# Patient Record
Sex: FEMALE | Race: WHITE | URBAN - METROPOLITAN AREA
[De-identification: names, ages, dates, MRNs, and addresses within clinical notes are randomized per-mention and may not be internally consistent; named-entity substitution may affect disease eponyms.]

---

## 2020-07-25 ENCOUNTER — TELEPHONE ENCOUNTER (OUTPATIENT)
Dept: URBAN - METROPOLITAN AREA CLINIC 13 | Facility: CLINIC | Age: 75
End: 2020-07-25

## 2020-07-25 RX ORDER — OMEGA-3/DHA/EPA/FISH OIL 300-1000MG
TAKE 1 TABLET DAILY AS DIRECTED CAPSULE ORAL
Refills: 0 | OUTPATIENT
End: 2013-07-22

## 2020-07-25 RX ORDER — HYOSCYAMINE SULFATE 0.12 MG/1
PLACE 1 TABLET EVERY 6 HOURS PRN TABLET, ORALLY DISINTEGRATING ORAL
Qty: 50 | Refills: 1 | OUTPATIENT
Start: 2013-08-22 | End: 2013-10-17

## 2020-07-25 RX ORDER — OMEPRAZOLE 40 MG/1
TAKE 1 CAPSULE DAILY CAPSULE, DELAYED RELEASE ORAL
Qty: 30 | Refills: 3 | OUTPATIENT
Start: 2013-05-20 | End: 2013-06-11

## 2020-07-25 RX ORDER — BIOTIN 2500 MCG
TAKE 1 CAPSULE DAILY CAPSULE ORAL
Refills: 0 | OUTPATIENT
End: 2014-06-16

## 2020-07-25 RX ORDER — LANSOPRAZOLE 30 MG/1
TAKE 1 CAPSULE DAILY CAPSULE, DELAYED RELEASE ORAL
Qty: 30 | Refills: 3 | OUTPATIENT
Start: 2013-06-11 | End: 2015-04-03

## 2020-07-25 RX ORDER — POLYETHYLENE GLYCOL 3350, SODIUM SULFATE, SODIUM CHLORIDE, POTASSIUM CHLORIDE, ASCORBIC ACID, SODIUM ASCORBATE 7.5-2.691G
USE AS DIRECTED KIT ORAL
Qty: 1 | Refills: 0 | OUTPATIENT
Start: 2013-03-29 | End: 2013-04-22

## 2020-07-25 RX ORDER — COLESEVELAM HYDROCHLORIDE 625 MG/1
TAKE  TABLET TWICE DAILY TABLET, FILM COATED ORAL
Qty: 60 | Refills: 3 | OUTPATIENT
Start: 2015-02-03 | End: 2015-08-24

## 2020-07-25 RX ORDER — PANTOPRAZOLE SODIUM 40 MG/1
TAKE 1 TABLET DAILY TABLET, DELAYED RELEASE ORAL
Qty: 30 | Refills: 2 | OUTPATIENT
Start: 2013-07-22 | End: 2013-08-22

## 2020-07-26 ENCOUNTER — TELEPHONE ENCOUNTER (OUTPATIENT)
Dept: URBAN - METROPOLITAN AREA CLINIC 13 | Facility: CLINIC | Age: 75
End: 2020-07-26

## 2020-07-26 RX ORDER — PRAVASTATIN SODIUM 20 MG/1
TAKE 1 TABLET DAILY TABLET ORAL
Refills: 0 | Status: ACTIVE | COMMUNITY

## 2020-07-26 RX ORDER — OMEPRAZOLE 20 MG/1
TAKE 1 CAPSULE DAILY BEFORE EATING CAPSULE, DELAYED RELEASE ORAL
Qty: 30 | Refills: 5 | Status: ACTIVE | COMMUNITY
Start: 2015-08-24

## 2020-07-26 RX ORDER — FOLIC ACID 0.8 MG
TAKE 1 CAPSULE DAILY TABLET ORAL
Refills: 0 | Status: ACTIVE | COMMUNITY

## 2020-07-26 RX ORDER — CHLORHEXIDINE GLUCONATE 4 %
TAKE 1 TABLET TWICE DAILY LIQUID (ML) TOPICAL
Qty: 60 | Refills: 1 | Status: ACTIVE | COMMUNITY
Start: 2015-02-15

## 2020-07-26 RX ORDER — CALCIUM CARBONATE/VITAMIN D3 500-10/5ML
TAKE 1 CAPSULE LIQUID (ML) ORAL
Refills: 0 | Status: ACTIVE | COMMUNITY

## 2020-07-26 RX ORDER — ASCORBIC ACID 500 MG
TAKE 1 TABLET DAILY TABLET ORAL
Refills: 0 | Status: ACTIVE | COMMUNITY

## 2021-08-30 ENCOUNTER — OFFICE VISIT (OUTPATIENT)
Dept: URBAN - METROPOLITAN AREA CLINIC 128 | Facility: CLINIC | Age: 76
End: 2021-08-30
Payer: MEDICARE

## 2021-08-30 ENCOUNTER — WEB ENCOUNTER (OUTPATIENT)
Dept: URBAN - METROPOLITAN AREA CLINIC 128 | Facility: CLINIC | Age: 76
End: 2021-08-30

## 2021-08-30 DIAGNOSIS — Z87.19 HISTORY OF BARRETT'S ESOPHAGUS: ICD-10-CM

## 2021-08-30 DIAGNOSIS — K50.019 CROHN'S DISEASE OF SMALL INTESTINE WITH COMPLICATION: ICD-10-CM

## 2021-08-30 DIAGNOSIS — R19.7 DIARRHEA, UNSPECIFIED TYPE: ICD-10-CM

## 2021-08-30 DIAGNOSIS — I72.9 ANEURYSM: ICD-10-CM

## 2021-08-30 DIAGNOSIS — R12 HEARTBURN: ICD-10-CM

## 2021-08-30 PROBLEM — 432119003: Status: ACTIVE | Noted: 2021-08-30

## 2021-08-30 PROCEDURE — 99214 OFFICE O/P EST MOD 30 MIN: CPT | Performed by: INTERNAL MEDICINE

## 2021-08-30 RX ORDER — OMEPRAZOLE 20 MG/1
TAKE 1 CAPSULE DAILY BEFORE EATING CAPSULE, DELAYED RELEASE ORAL
Qty: 30 | Refills: 5 | Status: ACTIVE | COMMUNITY
Start: 2015-08-24

## 2021-08-30 RX ORDER — ASCORBIC ACID 500 MG
TAKE 1 TABLET DAILY TABLET ORAL
Refills: 0 | Status: ACTIVE | COMMUNITY

## 2021-08-30 RX ORDER — FOLIC ACID 0.8 MG
TAKE 1 CAPSULE DAILY TABLET ORAL
Refills: 0 | Status: ACTIVE | COMMUNITY

## 2021-08-30 RX ORDER — CHLORHEXIDINE GLUCONATE 4 %
TAKE 1 TABLET TWICE DAILY LIQUID (ML) TOPICAL
Qty: 60 | Refills: 1 | Status: ACTIVE | COMMUNITY
Start: 2015-02-15

## 2021-08-30 RX ORDER — PRAVASTATIN SODIUM 20 MG/1
TAKE 1 TABLET DAILY TABLET ORAL
Refills: 0 | Status: ACTIVE | COMMUNITY

## 2021-08-30 RX ORDER — CALCIUM CARBONATE/VITAMIN D3 500-10/5ML
TAKE 1 CAPSULE LIQUID (ML) ORAL
Refills: 0 | Status: ACTIVE | COMMUNITY

## 2021-08-30 RX ORDER — AMLODIPINE AND BENAZEPRIL HYDROCHLORIDE 5; 20 MG/1; MG/1
TAKE 1 CAPSULE BY ORAL ROUTE ONCE DAILY CAPSULE ORAL 1
Qty: 0 | Refills: 0 | Status: ACTIVE | COMMUNITY
Start: 1900-01-01 | End: 1900-01-01

## 2021-08-30 NOTE — HPI-TODAY'S VISIT:
Mrs. Peña is a 75 year old female who was last seen in GI clinic on 2/14/2020 for Crohn's disease.    Today she reports having diarrhea. She reports coffee is a known trigger.   She reports having an aneursym for which she will be having surgery on 9/14/2021.   Prior history is summarized below: -She had an EGD and colonoscopy on 11/30/2017 with Dr. Herrera. The EGD showed a small hiatal. The colonoscopy showed non-bleeding erosions in the terminal ileum and small internal hemorrhoids. The GE junction biopsies showed reflux associated changes. Terminal ileum showed "mild chronic active enteritis, consistent with history of Crohn's disease. Random colon biopsies were unremarkable.  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  -She on no medications for Crohn's disease as she is concerned about side effects of Crohn's disease.

## 2021-09-20 ENCOUNTER — OFFICE VISIT (OUTPATIENT)
Dept: URBAN - METROPOLITAN AREA MEDICAL CENTER 25 | Facility: MEDICAL CENTER | Age: 76
End: 2021-09-20
Payer: MEDICARE

## 2021-09-20 DIAGNOSIS — K52.89 (LYMPHOCYTIC) MICROSCOPIC COLITIS: ICD-10-CM

## 2021-09-20 DIAGNOSIS — K29.80 ACUTE DUODENITIS: ICD-10-CM

## 2021-09-20 DIAGNOSIS — K50.10 CC (CROHN'S COLITIS): ICD-10-CM

## 2021-09-20 DIAGNOSIS — K29.60 ADENOPAPILLOMATOSIS GASTRICA: ICD-10-CM

## 2021-09-20 DIAGNOSIS — R19.7 ACUTE DIARRHEA: ICD-10-CM

## 2021-09-20 PROCEDURE — 43239 EGD BIOPSY SINGLE/MULTIPLE: CPT | Performed by: INTERNAL MEDICINE

## 2021-09-20 PROCEDURE — 45380 COLONOSCOPY AND BIOPSY: CPT | Performed by: INTERNAL MEDICINE

## 2021-09-20 RX ORDER — PRAVASTATIN SODIUM 20 MG/1
TAKE 1 TABLET DAILY TABLET ORAL
Refills: 0 | Status: ACTIVE | COMMUNITY

## 2021-09-20 RX ORDER — FOLIC ACID 0.8 MG
TAKE 1 CAPSULE DAILY TABLET ORAL
Refills: 0 | Status: ACTIVE | COMMUNITY

## 2021-09-20 RX ORDER — CHLORHEXIDINE GLUCONATE 4 %
TAKE 1 TABLET TWICE DAILY LIQUID (ML) TOPICAL
Qty: 60 | Refills: 1 | Status: ACTIVE | COMMUNITY
Start: 2015-02-15

## 2021-09-20 RX ORDER — CALCIUM CARBONATE/VITAMIN D3 500-10/5ML
TAKE 1 CAPSULE LIQUID (ML) ORAL
Refills: 0 | Status: ACTIVE | COMMUNITY

## 2021-09-20 RX ORDER — ASCORBIC ACID 500 MG
TAKE 1 TABLET DAILY TABLET ORAL
Refills: 0 | Status: ACTIVE | COMMUNITY

## 2021-09-20 RX ORDER — AMLODIPINE AND BENAZEPRIL HYDROCHLORIDE 5; 20 MG/1; MG/1
TAKE 1 CAPSULE BY ORAL ROUTE ONCE DAILY CAPSULE ORAL 1
Qty: 0 | Refills: 0 | Status: ACTIVE | COMMUNITY
Start: 1900-01-01 | End: 1900-01-01

## 2021-09-20 RX ORDER — OMEPRAZOLE 20 MG/1
TAKE 1 CAPSULE DAILY BEFORE EATING CAPSULE, DELAYED RELEASE ORAL
Qty: 30 | Refills: 5 | Status: ACTIVE | COMMUNITY
Start: 2015-08-24

## 2021-09-22 ENCOUNTER — TELEPHONE ENCOUNTER (OUTPATIENT)
Dept: URBAN - METROPOLITAN AREA CLINIC 92 | Facility: CLINIC | Age: 76
End: 2021-09-22

## 2021-10-07 ENCOUNTER — OFFICE VISIT (OUTPATIENT)
Dept: URBAN - METROPOLITAN AREA CLINIC 19 | Facility: CLINIC | Age: 76
End: 2021-10-07
Payer: MEDICARE

## 2021-10-07 DIAGNOSIS — R10.11 RUQ ABDOMINAL PAIN: ICD-10-CM

## 2021-10-07 DIAGNOSIS — K50.019 CROHN'S DISEASE OF SMALL INTESTINE WITH COMPLICATION: ICD-10-CM

## 2021-10-07 PROCEDURE — 99214 OFFICE O/P EST MOD 30 MIN: CPT | Performed by: INTERNAL MEDICINE

## 2021-10-07 RX ORDER — FOLIC ACID 0.8 MG
TAKE 1 CAPSULE DAILY TABLET ORAL
Refills: 0 | Status: ACTIVE | COMMUNITY

## 2021-10-07 RX ORDER — PRAVASTATIN SODIUM 20 MG/1
TAKE 1 TABLET DAILY TABLET ORAL
Refills: 0 | Status: ACTIVE | COMMUNITY

## 2021-10-07 RX ORDER — ASCORBIC ACID 500 MG
TAKE 1 TABLET DAILY TABLET ORAL
Refills: 0 | Status: ACTIVE | COMMUNITY

## 2021-10-07 RX ORDER — AMLODIPINE AND BENAZEPRIL HYDROCHLORIDE 5; 20 MG/1; MG/1
TAKE 1 CAPSULE BY ORAL ROUTE ONCE DAILY CAPSULE ORAL 1
Qty: 0 | Refills: 0 | Status: ACTIVE | COMMUNITY
Start: 1900-01-01

## 2021-10-07 RX ORDER — CALCIUM CARBONATE/VITAMIN D3 500-10/5ML
TAKE 1 CAPSULE LIQUID (ML) ORAL
Refills: 0 | Status: ACTIVE | COMMUNITY

## 2021-10-07 RX ORDER — CHLORHEXIDINE GLUCONATE 4 %
TAKE 1 TABLET TWICE DAILY LIQUID (ML) TOPICAL
Qty: 60 | Refills: 1 | Status: ACTIVE | COMMUNITY
Start: 2015-02-15

## 2021-10-07 RX ORDER — OMEPRAZOLE 20 MG/1
TAKE 1 CAPSULE DAILY BEFORE EATING CAPSULE, DELAYED RELEASE ORAL
Qty: 30 | Refills: 5 | Status: ACTIVE | COMMUNITY
Start: 2015-08-24

## 2021-10-07 NOTE — HPI-TODAY'S VISIT:
Mrs. Peña is a 76 year old female who was last seen in GI clinic on 8/30/2021 for Crohn's disease.       On 9/20/2021 she had an EGD and colonoscopy. EGD showed small hiatal hernia, mild gastritis, and multiple erosions in the duodenum. The colonoscopy showed multiple ulcers in soham-terminal ileum.   Pathology of the lower esophagus was unremarkable. Gastric biopsies were negative for H. pylori. Duodenal biopsies were negative for Celiac disease. Terminal ileal biopsies showed focal acute inflammations. Random colon biopsies were negative for microscopic colitis.   She had a CT chest performed by her Pulmonary physician which showed compared to 6/4/2021 there was a decreased size and conspicuity of a groundglass nodule in the lateral right lower lobe. This currently measures up to 3 mm, previously 8 mm in keeping with benign infectious or inflammatory etiology. Additional subcentimeter bilateral pulmonary nodules measuring up to 5 mm are stable." The report mentions cholelithiasis without surrounding inflammation to suggest acute cholecystitis. Calcified granulomas in the liver and spleen.  Today she reports having RUQ abdominal pain. She is concerned her pain may be secondary to exercises.   She reports she is going to have aneuysm surgery soon.   Prior history is summarized below: -She had an EGD and colonoscopy on 11/30/2017 with Dr. Herrera. The EGD showed a small hiatal. The colonoscopy showed non-bleeding erosions in the terminal ileum and small internal hemorrhoids. The GE junction biopsies showed reflux associated changes. Terminal ileum showed "mild chronic active enteritis, consistent with history of Crohn's disease. Random colon biopsies were unremarkable.  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  -She on no medications for Crohn's disease as she is concerned about side effects of Crohn's disease.

## 2021-10-21 ENCOUNTER — OFFICE VISIT (OUTPATIENT)
Dept: URBAN - METROPOLITAN AREA CLINIC 19 | Facility: CLINIC | Age: 76
End: 2021-10-21
Payer: MEDICARE

## 2021-10-21 DIAGNOSIS — R10.11 RUQ ABDOMINAL PAIN: ICD-10-CM

## 2021-10-21 DIAGNOSIS — K50.019 CROHN'S DISEASE OF SMALL INTESTINE WITH COMPLICATION: ICD-10-CM

## 2021-10-21 PROCEDURE — 99214 OFFICE O/P EST MOD 30 MIN: CPT | Performed by: INTERNAL MEDICINE

## 2021-10-21 RX ORDER — FOLIC ACID 0.8 MG
TAKE 1 CAPSULE DAILY TABLET ORAL
Refills: 0 | Status: ACTIVE | COMMUNITY

## 2021-10-21 RX ORDER — OMEPRAZOLE 20 MG/1
TAKE 1 CAPSULE DAILY BEFORE EATING CAPSULE, DELAYED RELEASE ORAL
Qty: 30 | Refills: 5 | Status: ACTIVE | COMMUNITY
Start: 2015-08-24

## 2021-10-21 RX ORDER — PANTOPRAZOLE SODIUM 20 MG/1
1 TABLET TABLET, DELAYED RELEASE ORAL ONCE A DAY
Qty: 90 TABLET | Refills: 3 | OUTPATIENT
Start: 2021-10-21

## 2021-10-21 RX ORDER — AMLODIPINE AND BENAZEPRIL HYDROCHLORIDE 5; 20 MG/1; MG/1
TAKE 1 CAPSULE BY ORAL ROUTE ONCE DAILY CAPSULE ORAL 1
Qty: 0 | Refills: 0 | Status: ACTIVE | COMMUNITY
Start: 1900-01-01

## 2021-10-21 RX ORDER — CALCIUM CARBONATE/VITAMIN D3 500-10/5ML
TAKE 1 CAPSULE LIQUID (ML) ORAL
Refills: 0 | Status: ACTIVE | COMMUNITY

## 2021-10-21 RX ORDER — CHLORHEXIDINE GLUCONATE 4 %
TAKE 1 TABLET TWICE DAILY LIQUID (ML) TOPICAL
Qty: 60 | Refills: 1 | Status: ACTIVE | COMMUNITY
Start: 2015-02-15

## 2021-10-21 RX ORDER — PRAVASTATIN SODIUM 20 MG/1
TAKE 1 TABLET DAILY TABLET ORAL
Refills: 0 | Status: ACTIVE | COMMUNITY

## 2021-10-21 RX ORDER — ASCORBIC ACID 500 MG
TAKE 1 TABLET DAILY TABLET ORAL
Refills: 0 | Status: ACTIVE | COMMUNITY

## 2021-10-21 NOTE — HPI-TODAY'S VISIT:
Mrs. Peña is a 76 year old female who was last seen in GI clinic on 10/7/2021 for Crohn's disease.         On 10/11/2021 RUQ US showed cholelithiasis without significant gallbladder wall thickening.   She reports doing well with protonix 40mg PO daily.   Prior history is summarized below: -She had an EGD and colonoscopy on 11/30/2017 with Dr. Herrera. The EGD showed a small hiatal. The colonoscopy showed non-bleeding erosions in the terminal ileum and small internal hemorrhoids. The GE junction biopsies showed reflux associated changes. Terminal ileum showed "mild chronic active enteritis, consistent with history of Crohn's disease. Random colon biopsies were unremarkable.  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  -She on no medications for Crohn's disease as she is concerned about side effects of Crohn's disease. -On 9/20/2021 she had an EGD and colonoscopy. EGD showed small hiatal hernia, mild gastritis, and multiple erosions in the duodenum. The colonoscopy showed multiple ulcers in soham-terminal ileum. Pathology of the lower esophagus was unremarkable. Gastric biopsies were negative for H. pylori. Duodenal biopsies were negative for Celiac disease. Terminal ileal biopsies showed focal acute inflammations. Random colon biopsies were negative for microscopic colitis.  -She had a CT chest performed by her Pulmonary physician which showed compared to 6/4/2021 there was a decreased size and conspicuity of a groundglass nodule in the lateral right lower lobe. This currently measures up to 3 mm, previously 8 mm in keeping with benign infectious or inflammatory etiology. Additional subcentimeter bilateral pulmonary nodules measuring up to 5 mm are stable." The report mentions cholelithiasis without surrounding inflammation to suggest acute cholecystitis. Calcified granulomas in the liver and spleen.

## 2021-10-22 ENCOUNTER — TELEPHONE ENCOUNTER (OUTPATIENT)
Dept: URBAN - METROPOLITAN AREA CLINIC 19 | Facility: CLINIC | Age: 76
End: 2021-10-22

## 2022-04-20 ENCOUNTER — OFFICE VISIT (OUTPATIENT)
Dept: URBAN - METROPOLITAN AREA CLINIC 19 | Facility: CLINIC | Age: 77
End: 2022-04-20

## 2022-04-25 ENCOUNTER — OFFICE VISIT (OUTPATIENT)
Dept: URBAN - METROPOLITAN AREA CLINIC 128 | Facility: CLINIC | Age: 77
End: 2022-04-25

## 2022-05-06 ENCOUNTER — OFFICE VISIT (OUTPATIENT)
Dept: URBAN - METROPOLITAN AREA CLINIC 128 | Facility: CLINIC | Age: 77
End: 2022-05-06

## 2022-05-13 ENCOUNTER — OFFICE VISIT (OUTPATIENT)
Dept: URBAN - METROPOLITAN AREA CLINIC 128 | Facility: CLINIC | Age: 77
End: 2022-05-13
Payer: MEDICARE

## 2022-05-13 ENCOUNTER — WEB ENCOUNTER (OUTPATIENT)
Dept: URBAN - METROPOLITAN AREA CLINIC 128 | Facility: CLINIC | Age: 77
End: 2022-05-13

## 2022-05-13 DIAGNOSIS — K50.019 CROHN'S DISEASE OF SMALL INTESTINE WITH COMPLICATION: ICD-10-CM

## 2022-05-13 PROCEDURE — 99214 OFFICE O/P EST MOD 30 MIN: CPT | Performed by: INTERNAL MEDICINE

## 2022-05-13 RX ORDER — CALCIUM CARBONATE/VITAMIN D3 500-10/5ML
TAKE 1 CAPSULE LIQUID (ML) ORAL
Refills: 0 | Status: DISCONTINUED | COMMUNITY

## 2022-05-13 RX ORDER — FOLIC ACID 0.8 MG
TAKE 1 CAPSULE DAILY TABLET ORAL
Refills: 0 | Status: DISCONTINUED | COMMUNITY

## 2022-05-13 RX ORDER — PRAVASTATIN SODIUM 20 MG/1
TAKE 1 TABLET DAILY TABLET ORAL
Refills: 0 | Status: ACTIVE | COMMUNITY

## 2022-05-13 RX ORDER — PANTOPRAZOLE SODIUM 20 MG/1
1 TABLET TABLET, DELAYED RELEASE ORAL ONCE A DAY
Qty: 90 TABLET | Refills: 3 | Status: ACTIVE | COMMUNITY
Start: 2021-10-21

## 2022-05-13 RX ORDER — AMLODIPINE AND BENAZEPRIL HYDROCHLORIDE 5; 20 MG/1; MG/1
TAKE 1 CAPSULE BY ORAL ROUTE ONCE DAILY CAPSULE ORAL 1
Qty: 0 | Refills: 0 | Status: ACTIVE | COMMUNITY
Start: 1900-01-01

## 2022-05-13 RX ORDER — OMEPRAZOLE 20 MG/1
TAKE 1 CAPSULE DAILY BEFORE EATING CAPSULE, DELAYED RELEASE ORAL
Qty: 30 | Refills: 5 | Status: DISCONTINUED | COMMUNITY
Start: 2015-08-24

## 2022-05-13 RX ORDER — ASCORBIC ACID 500 MG
TAKE 1 TABLET DAILY TABLET ORAL
Refills: 0 | Status: ACTIVE | COMMUNITY

## 2022-05-13 RX ORDER — CHLORHEXIDINE GLUCONATE 4 %
TAKE 1 TABLET TWICE DAILY LIQUID (ML) TOPICAL
Qty: 60 | Refills: 1 | Status: DISCONTINUED | COMMUNITY
Start: 2015-02-15

## 2022-05-13 NOTE — HPI-TODAY'S VISIT:
Mrs. Peña is a 76 year old female who was last seen in GI clinic on 10/7/2021 for Crohn's disease.            At last clinic visit we prescribed budesonide which she reports not taking. She reports being concerned about side effects of medications and wants to know about other options she may have.   Prior history is summarized below: -She had an EGD and colonoscopy on 11/30/2017 with Dr. Herrera. The EGD showed a small hiatal. The colonoscopy showed non-bleeding erosions in the terminal ileum and small internal hemorrhoids. The GE junction biopsies showed reflux associated changes. Terminal ileum showed "mild chronic active enteritis, consistent with history of Crohn's disease. Random colon biopsies were unremarkable.  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  -She on no medications for Crohn's disease as she is concerned about side effects of Crohn's disease. -On 9/20/2021 she had an EGD and colonoscopy. EGD showed small hiatal hernia, mild gastritis, and multiple erosions in the duodenum. The colonoscopy showed multiple ulcers in soham-terminal ileum. Pathology of the lower esophagus was unremarkable. Gastric biopsies were negative for H. pylori. Duodenal biopsies were negative for Celiac disease. Terminal ileal biopsies showed focal acute inflammations. Random colon biopsies were negative for microscopic colitis.  -She had a CT chest performed by her Pulmonary physician which showed compared to 6/4/2021 there was a decreased size and conspicuity of a groundglass nodule in the lateral right lower lobe. This currently measures up to 3 mm, previously 8 mm in keeping with benign infectious or inflammatory etiology. Additional subcentimeter bilateral pulmonary nodules measuring up to 5 mm are stable." The report mentions cholelithiasis without surrounding inflammation to suggest acute cholecystitis. Calcified granulomas in the liver and spleen. -On 10/11/2021 RUQ US showed cholelithiasis without significant gallbladder wall thickening.

## 2022-05-16 ENCOUNTER — TELEPHONE ENCOUNTER (OUTPATIENT)
Dept: URBAN - METROPOLITAN AREA CLINIC 19 | Facility: CLINIC | Age: 77
End: 2022-05-16

## 2022-06-08 ENCOUNTER — OFFICE VISIT (OUTPATIENT)
Dept: URBAN - METROPOLITAN AREA CLINIC 96 | Facility: CLINIC | Age: 77
End: 2022-06-08
Payer: MEDICARE

## 2022-06-08 DIAGNOSIS — R12 HEARTBURN: ICD-10-CM

## 2022-06-08 DIAGNOSIS — R19.7 DIARRHEA, UNSPECIFIED TYPE: ICD-10-CM

## 2022-06-08 DIAGNOSIS — R10.11 RUQ ABDOMINAL PAIN: ICD-10-CM

## 2022-06-08 DIAGNOSIS — K50.019 CROHN'S DISEASE OF SMALL INTESTINE WITH COMPLICATION: ICD-10-CM

## 2022-06-08 PROCEDURE — 99205 OFFICE O/P NEW HI 60 MIN: CPT | Performed by: INTERNAL MEDICINE

## 2022-06-08 RX ORDER — PANTOPRAZOLE SODIUM 20 MG/1
1 TABLET TABLET, DELAYED RELEASE ORAL ONCE A DAY
Qty: 90 TABLET | Refills: 3 | Status: ACTIVE | COMMUNITY
Start: 2021-10-21

## 2022-06-08 RX ORDER — PRAVASTATIN SODIUM 20 MG/1
TAKE 1 TABLET DAILY TABLET ORAL
Refills: 0 | Status: ACTIVE | COMMUNITY

## 2022-06-08 RX ORDER — AMLODIPINE AND BENAZEPRIL HYDROCHLORIDE 5; 20 MG/1; MG/1
TAKE 1 CAPSULE BY ORAL ROUTE ONCE DAILY CAPSULE ORAL 1
Qty: 0 | Refills: 0 | Status: ACTIVE | COMMUNITY
Start: 1900-01-01

## 2022-06-08 RX ORDER — ASCORBIC ACID 500 MG
TAKE 1 TABLET DAILY TABLET ORAL
Refills: 0 | Status: ACTIVE | COMMUNITY

## 2022-06-08 RX ORDER — MESALAMINE 375 MG/1
4 CAPSULES IN THE MORNING CAPSULE, EXTENDED RELEASE ORAL ONCE A DAY
Qty: 120 CAPSULE | Refills: 11 | OUTPATIENT
Start: 2022-06-08 | End: 2023-06-03

## 2022-06-08 NOTE — HPI-TODAY'S VISIT:
Mrs. Peña is a 76 year old female w/ ileal Crohn's disease, currently on no meds, here for eval of her condition. . Pt is referred by Dr. Dustin Chaves. . She was dxd at time of surgery with CD around 2015 requiring ileo-colonic resection in [place].  She has done well since then. . Today on 6/8/2022, pt reports that she has rare sxs of RLQ abdominal pain especially if she eats certain foods or seeds.  Some diarrhea, 1 per day, improves with imodium.  No blood in the stool.   . EGD and colonoscopy on 11/30/2017 with Dr. Herrera. The EGD showed a small hiatal. The colonoscopy showed non-bleeding erosions in the terminal ileum and small internal hemorrhoids. The GE junction biopsies showed reflux associated changes. Terminal ileum showed "mild chronic active enteritis, consistent with history of Crohn's disease. Random colon biopsies were unremarkable.  . 9/2021: Neoterminal ileum appeared normal with multiple ulcers present;  Path: mild gastritis; nl duodenum, focal acute inflammation in ileum, nl colon biopsies. . CT chest performed by her Pulmonary physician which showed compared to 6/4/2021 there was a decreased size and conspicuity of a groundglass nodule in the lateral right lower lobe. This currently measures up to 3 mm, previously 8 mm in keeping with benign infectious or inflammatory etiology. Additional subcentimeter bilateral pulmonary nodules measuring up to 5 mm are stable." The report mentions cholelithiasis without surrounding inflammation to suggest acute cholecystitis. Calcified granulomas in the liver and spleen.. 10/11/2021 RUQ US showed cholelithiasis without significant gallbladder wall thickening.

## 2022-06-13 ENCOUNTER — TELEPHONE ENCOUNTER (OUTPATIENT)
Dept: URBAN - METROPOLITAN AREA CLINIC 92 | Facility: CLINIC | Age: 77
End: 2022-06-13

## 2022-06-15 ENCOUNTER — TELEPHONE ENCOUNTER (OUTPATIENT)
Dept: URBAN - METROPOLITAN AREA CLINIC 92 | Facility: CLINIC | Age: 77
End: 2022-06-15

## 2022-07-28 ENCOUNTER — OFFICE VISIT (OUTPATIENT)
Dept: URBAN - METROPOLITAN AREA CLINIC 19 | Facility: CLINIC | Age: 77
End: 2022-07-28

## 2022-09-12 ENCOUNTER — OFFICE VISIT (OUTPATIENT)
Dept: URBAN - METROPOLITAN AREA TELEHEALTH 2 | Facility: TELEHEALTH | Age: 77
End: 2022-09-12
Payer: MEDICARE

## 2022-09-12 VITALS — BODY MASS INDEX: 25.16 KG/M2 | HEIGHT: 63 IN | WEIGHT: 142 LBS

## 2022-09-12 DIAGNOSIS — T88.7XXA DRUG REACTION: ICD-10-CM

## 2022-09-12 DIAGNOSIS — K50.019 CROHN'S DISEASE OF SMALL INTESTINE WITH COMPLICATION: ICD-10-CM

## 2022-09-12 DIAGNOSIS — R12 HEARTBURN: ICD-10-CM

## 2022-09-12 DIAGNOSIS — R10.11 RUQ ABDOMINAL PAIN: ICD-10-CM

## 2022-09-12 DIAGNOSIS — R19.7 DIARRHEA, UNSPECIFIED TYPE: ICD-10-CM

## 2022-09-12 PROBLEM — 56689002: Status: ACTIVE | Noted: 2021-08-30

## 2022-09-12 PROBLEM — 16331000: Status: ACTIVE | Noted: 2021-09-15

## 2022-09-12 PROCEDURE — 99214 OFFICE O/P EST MOD 30 MIN: CPT | Performed by: INTERNAL MEDICINE

## 2022-09-12 RX ORDER — MESALAMINE 375 MG/1
4 CAPSULES IN THE MORNING CAPSULE, EXTENDED RELEASE ORAL ONCE A DAY
Qty: 120 CAPSULE | Refills: 11 | Status: ACTIVE | COMMUNITY
Start: 2022-06-08 | End: 2023-06-03

## 2022-09-12 RX ORDER — ASCORBIC ACID 500 MG
TAKE 1 TABLET DAILY TABLET ORAL
Refills: 0 | Status: ACTIVE | COMMUNITY

## 2022-09-12 RX ORDER — PANTOPRAZOLE SODIUM 20 MG/1
1 TABLET TABLET, DELAYED RELEASE ORAL ONCE A DAY
Qty: 90 TABLET | Refills: 3 | Status: ACTIVE | COMMUNITY
Start: 2021-10-21

## 2022-09-12 RX ORDER — MESALAMINE 375 MG/1
4 CAPSULES IN THE MORNING CAPSULE, EXTENDED RELEASE ORAL ONCE A DAY
Qty: 120 CAPSULE | Refills: 11 | OUTPATIENT

## 2022-09-12 RX ORDER — PRAVASTATIN SODIUM 20 MG/1
TAKE 1 TABLET DAILY TABLET ORAL
Refills: 0 | Status: ACTIVE | COMMUNITY

## 2022-09-12 RX ORDER — AMLODIPINE AND BENAZEPRIL HYDROCHLORIDE 5; 20 MG/1; MG/1
TAKE 1 CAPSULE BY ORAL ROUTE ONCE DAILY CAPSULE ORAL 1
Qty: 0 | Refills: 0 | Status: ACTIVE | COMMUNITY
Start: 1900-01-01

## 2022-09-12 NOTE — HPI-TODAY'S VISIT:
Mrs. Peña is a 77 year old female w/ ileal Crohn's disease, currently on Apriso (4 tab), here for eval of her condition. . Pt is referred by Dr. Dustin Chaves. . She was dxd at time of surgery with CD around 2015 requiring ileo-colonic resection in [place].  She has done well since then. . Previously on 6/8/2022, pt reports that she has rare sxs of RLQ abdominal pain especially if she eats certain foods or seeds.  Some diarrhea, 1 per day, improves with imodium.  No blood in the stool.   . Today on 9/12/2022, pt reports that she noticed that she had some abdominal pain when she took the apriso, so she stopped it and sxs resolved.  It actually seemed to caused looser stools. Gerd controlled on PPI. . EGD and colonoscopy on 11/30/2017 with Dr. Herrera. The EGD showed a small hiatal. The colonoscopy showed non-bleeding erosions in the terminal ileum and small internal hemorrhoids. The GE junction biopsies showed reflux associated changes. Terminal ileum showed "mild chronic active enteritis, consistent with history of Crohn's disease. Random colon biopsies were unremarkable. . 9/2021: Neoterminal ileum appeared normal with multiple ulcers present;  Path: mild gastritis; nl duodenum, focal acute inflammation in ileum, nl colon biopsies. . CT chest performed by her Pulmonary physician which showed compared to 6/4/2021 there was a decreased size and conspicuity of a groundglass nodule in the lateral right lower lobe. This currently measures up to 3 mm, previously 8 mm in keeping with benign infectious or inflammatory etiology. Additional subcentimeter bilateral pulmonary nodules measuring up to 5 mm are stable." The report mentions cholelithiasis without surrounding inflammation to suggest acute cholecystitis. Calcified granulomas in the liver and spleen.. 10/11/2021 RUQ US showed cholelithiasis without significant gallbladder wall thickening.

## 2023-07-13 ENCOUNTER — OFFICE VISIT (OUTPATIENT)
Dept: URBAN - METROPOLITAN AREA CLINIC 19 | Facility: CLINIC | Age: 78
End: 2023-07-13
Payer: MEDICARE

## 2023-07-13 ENCOUNTER — TELEPHONE ENCOUNTER (OUTPATIENT)
Dept: URBAN - METROPOLITAN AREA CLINIC 19 | Facility: CLINIC | Age: 78
End: 2023-07-13

## 2023-07-13 ENCOUNTER — LAB OUTSIDE AN ENCOUNTER (OUTPATIENT)
Dept: URBAN - METROPOLITAN AREA CLINIC 19 | Facility: CLINIC | Age: 78
End: 2023-07-13

## 2023-07-13 VITALS
HEIGHT: 63 IN | HEART RATE: 69 BPM | TEMPERATURE: 97.2 F | OXYGEN SATURATION: 98 % | SYSTOLIC BLOOD PRESSURE: 124 MMHG | BODY MASS INDEX: 23.81 KG/M2 | WEIGHT: 134.4 LBS | DIASTOLIC BLOOD PRESSURE: 70 MMHG

## 2023-07-13 DIAGNOSIS — K50.019 CROHN'S DISEASE OF SMALL INTESTINE WITH COMPLICATION: ICD-10-CM

## 2023-07-13 PROCEDURE — 99215 OFFICE O/P EST HI 40 MIN: CPT | Performed by: NURSE PRACTITIONER

## 2023-07-13 RX ORDER — PRAVASTATIN SODIUM 20 MG/1
TAKE 1 TABLET DAILY TABLET ORAL
Refills: 0 | Status: ACTIVE | COMMUNITY

## 2023-07-13 RX ORDER — MESALAMINE 375 MG/1
4 CAPSULES IN THE MORNING CAPSULE, EXTENDED RELEASE ORAL ONCE A DAY
Qty: 120 CAPSULE | Refills: 11 | Status: ACTIVE | COMMUNITY

## 2023-07-13 RX ORDER — ASCORBIC ACID 500 MG
TAKE 1 TABLET DAILY TABLET ORAL
Refills: 0 | Status: ACTIVE | COMMUNITY

## 2023-07-13 RX ORDER — PANTOPRAZOLE SODIUM 20 MG/1
1 TABLET TABLET, DELAYED RELEASE ORAL ONCE A DAY
Qty: 90 TABLET | Refills: 3 | Status: ACTIVE | COMMUNITY
Start: 2021-10-21

## 2023-07-13 RX ORDER — AMLODIPINE AND BENAZEPRIL HYDROCHLORIDE 5; 20 MG/1; MG/1
TAKE 1 CAPSULE BY ORAL ROUTE ONCE DAILY CAPSULE ORAL 1
Qty: 0 | Refills: 0 | Status: ACTIVE | COMMUNITY
Start: 1900-01-01

## 2023-07-13 NOTE — HPI-TODAY'S VISIT:
Ms. Peña is a 77-year-old female with ileal Crohn's disease who presents today for "bubbles in urine."  Reports seeing some bubbles in her urine in toilet  - saw Urology who did a work-up and found no issue. Told her to go see GI. She brings in pictures, urine looks normal and there a just a few bublles on the side of the toilet. Has gotten better, but notices in the morning.  Only notices it in her own toilet, not in any other toilets. Appetite is very good, has been trying to lose weight. Diarrhea when she eats bad, she has a hard time with this. Can be watery diarrhea 3-4 times/day. When she eats good, she has 1-2 mushy stools/day. No bloody or black stools. No abdominal or rectal pain. No pain or blood with urination.    Last seen in GI clinic by Dr. Chaves on 5/13/2022.  At the time he had recommended budesonide but she did not take it due to being concerned about side effects.  She was interested in options that had the least amount of side effects so she was referred to Dr. Eyad Toney.  She was subsequently started on Apriso (4 tabs) daily 0.375 g His last note indicates that she did not tolerate it in terms of abdominal pain and loose stools.  She reported that given her age, she did not want to really be aggressive with new medications unless there were symptom changes.  He said she could stop Apriso for now and if symptoms recur, to restart at 2/day instead of 4.   When asked about this, she seems unaware that she was taking anything. Later recalled she had some sprinkles prescribed that she put in her applesauce and that it worked well. She forgot to bring in the medication today so does not know the name. She states that she felt as long as she did not eat bad food, her diarrhea was better so did not feel the need to take any medication. Yet, having loose stools everyday. She also states that since she does not have pain, she sees no reason why she should be on any treatment. States she does not want any side effects. Also states she has a difficult time not eating the things that she shouldn't, loves to eat, so often has diarrhea.         Prior history is summarized below: - Chron's disease dxd at time of surgery with CD around 2015 requiring ileo-colonic resection in [place]        -She had an EGD and colonoscopy on 11/30/2017 with Dr. Herrera. The EGD showed a small hiatal. The colonoscopy showed non-bleeding erosions in the terminal ileum and small internal hemorrhoids. The GE junction biopsies showed reflux associated changes. Terminal ileum showed "mild chronic active enteritis, consistent with history of Crohn's disease. Random colon biopsies were unremarkable.        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        -She on no medications for Crohn's disease as she is concerned about side effects of Crohn's disease.        -On 9/20/2021 she had an EGD and colonoscopy. EGD showed small hiatal hernia, mild gastritis, and multiple erosions in the duodenum. The colonoscopy showed multiple ulcers in soham-terminal ileum. Pathology of the lower esophagus was unremarkable. Gastric biopsies were negative for H. pylori. Duodenal biopsies were negative for Celiac disease. Terminal ileal biopsies showed focal acute inflammations. Random colon biopsies were negative for microscopic colitis.        -She had a CT chest performed by her Pulmonary physician which showed compared to 6/4/2021 there was a decreased size and conspicuity of a groundglass nodule in the lateral right lower lobe. This currently measures up to 3 mm, previously 8 mm in keeping with benign infectious or inflammatory etiology. Additional subcentimeter bilateral pulmonary nodules measuring up to 5 mm are stable." The report mentions cholelithiasis without surrounding inflammation to suggest acute cholecystitis. Calcified granulomas in the liver and spleen.        -On 10/11/2021 RUQ US showed cholelithiasis without significant gallbladder wall thickening..

## 2023-07-24 ENCOUNTER — LAB OUTSIDE AN ENCOUNTER (OUTPATIENT)
Dept: URBAN - METROPOLITAN AREA CLINIC 19 | Facility: CLINIC | Age: 78
End: 2023-07-24

## 2023-07-24 LAB
CREATININE POC: 1.3
PERFORMING LAB: (no result)

## 2023-09-14 ENCOUNTER — LAB OUTSIDE AN ENCOUNTER (OUTPATIENT)
Dept: URBAN - METROPOLITAN AREA CLINIC 19 | Facility: CLINIC | Age: 78
End: 2023-09-14

## 2023-09-14 ENCOUNTER — OFFICE VISIT (OUTPATIENT)
Dept: URBAN - METROPOLITAN AREA CLINIC 19 | Facility: CLINIC | Age: 78
End: 2023-09-14
Payer: MEDICARE

## 2023-09-14 VITALS
TEMPERATURE: 97.3 F | WEIGHT: 133.2 LBS | HEART RATE: 81 BPM | SYSTOLIC BLOOD PRESSURE: 119 MMHG | DIASTOLIC BLOOD PRESSURE: 74 MMHG | HEIGHT: 63 IN | BODY MASS INDEX: 23.6 KG/M2

## 2023-09-14 DIAGNOSIS — K22.70 BARRETT'S ESOPHAGUS DETERMINED BY ENDOSCOPY: ICD-10-CM

## 2023-09-14 DIAGNOSIS — R82.90 ABNORMAL URINE: ICD-10-CM

## 2023-09-14 DIAGNOSIS — K50.019 CROHN'S DISEASE OF SMALL INTESTINE WITH COMPLICATION: ICD-10-CM

## 2023-09-14 PROBLEM — 302914006: Status: ACTIVE | Noted: 2023-09-14

## 2023-09-14 PROCEDURE — 99214 OFFICE O/P EST MOD 30 MIN: CPT | Performed by: INTERNAL MEDICINE

## 2023-09-14 RX ORDER — MESALAMINE 375 MG/1
4 CAPSULES IN THE MORNING CAPSULE, EXTENDED RELEASE ORAL ONCE A DAY
Qty: 120 CAPSULE | Refills: 11 | Status: ACTIVE | COMMUNITY

## 2023-09-14 RX ORDER — PANTOPRAZOLE SODIUM 20 MG/1
1 TABLET TABLET, DELAYED RELEASE ORAL ONCE A DAY
Qty: 90 TABLET | Refills: 3 | Status: ACTIVE | COMMUNITY
Start: 2021-10-21

## 2023-09-14 RX ORDER — PRAVASTATIN SODIUM 20 MG/1
TAKE 1 TABLET DAILY TABLET ORAL
Refills: 0 | Status: ACTIVE | COMMUNITY

## 2023-09-14 RX ORDER — ASCORBIC ACID 500 MG
TAKE 1 TABLET DAILY TABLET ORAL
Refills: 0 | Status: ACTIVE | COMMUNITY

## 2023-09-14 RX ORDER — AMLODIPINE AND BENAZEPRIL HYDROCHLORIDE 5; 20 MG/1; MG/1
TAKE 1 CAPSULE BY ORAL ROUTE ONCE DAILY CAPSULE ORAL 1
Qty: 0 | Refills: 0 | Status: ACTIVE | COMMUNITY
Start: 1900-01-01

## 2023-09-14 NOTE — HPI-TODAY'S VISIT:
Mrs. Peña is a 78-year-old female who was last seen in GI clinic on 7/13/2023 for ileal Crohn's disease.   On 7/24/2023 CTE showed "no acute abnormalities within the abdomen or pelvis.   Today she reports seeing in her urine bubbles and specks in her urine.   Prior history is summarized below: - Crohn's disease dxd at time of surgery with CD around 2015 requiring ileo-colonic resection in [place] -She had an EGD and colonoscopy on 11/30/2017 with Dr. Herrera. The EGD showed a small hiatal. The colonoscopy showed non-bleeding erosions in the terminal ileum and small internal hemorrhoids. The GE junction biopsies showed reflux associated changes. Terminal ileum showed "mild chronic active enteritis, consistent with history of Crohn's disease. Random colon biopsies were unremarkable. 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 -She on no medications for Crohn's disease as she is concerned about side effects of Crohn's disease. -On 9/20/2021 she had an EGD and colonoscopy. EGD showed small hiatal hernia, mild gastritis, and multiple erosions in the duodenum. The colonoscopy showed multiple ulcers in soham-terminal ileum. Pathology of the lower esophagus was unremarkable. Gastric biopsies were negative for H. pylori. Duodenal biopsies were negative for Celiac disease. Terminal ileal biopsies showed focal acute inflammations. Random colon biopsies were negative for microscopic colitis. -She had a CT chest performed by her Pulmonary physician which showed compared to 6/4/2021 there was a decreased size and conspicuity of a groundglass nodule in the lateral right lower lobe. This currently measures up to 3 mm, previously 8 mm in keeping with benign infectious or inflammatory etiology. Additional subcentimeter bilateral pulmonary nodules measuring up to 5 mm are stable." The report mentions cholelithiasis without surrounding inflammation to suggest acute cholecystitis. Calcified granulomas in the liver and spleen.  -On 10/11/2021 RUQ US showed cholelithiasis without significant gallbladder wall thickening..

## 2023-11-08 ENCOUNTER — CLAIMS CREATED FROM THE CLAIM WINDOW (OUTPATIENT)
Dept: URBAN - METROPOLITAN AREA CLINIC 4 | Facility: CLINIC | Age: 78
End: 2023-11-08
Payer: MEDICARE

## 2023-11-08 ENCOUNTER — TELEPHONE ENCOUNTER (OUTPATIENT)
Dept: URBAN - METROPOLITAN AREA CLINIC 19 | Facility: CLINIC | Age: 78
End: 2023-11-08

## 2023-11-08 ENCOUNTER — OFFICE VISIT (OUTPATIENT)
Dept: URBAN - METROPOLITAN AREA SURGERY CENTER 31 | Facility: SURGERY CENTER | Age: 78
End: 2023-11-08
Payer: MEDICARE

## 2023-11-08 DIAGNOSIS — R12 BURNING REFLUX: ICD-10-CM

## 2023-11-08 DIAGNOSIS — K29.60 ADENOPAPILLOMATOSIS GASTRICA: ICD-10-CM

## 2023-11-08 DIAGNOSIS — K50.00 CROHN''S DISEASE OF SMALL INTESTINE WITHOUT COMPLICATION: ICD-10-CM

## 2023-11-08 DIAGNOSIS — K44.9 HIATAL HERNIA: ICD-10-CM

## 2023-11-08 DIAGNOSIS — K50.00 CICATRIZING ENTEROCOLITIS: ICD-10-CM

## 2023-11-08 DIAGNOSIS — K29.70 GASTRITIS, UNSPECIFIED, WITHOUT BLEEDING: ICD-10-CM

## 2023-11-08 DIAGNOSIS — K50.00 CROHN'S DISEASE OF SMALL INTESTINE WITHOUT COMPLICATIONS: ICD-10-CM

## 2023-11-08 DIAGNOSIS — R12 HEARTBURN: ICD-10-CM

## 2023-11-08 DIAGNOSIS — Z12.11 COLON CANCER SCREENING (HIGH RISK): ICD-10-CM

## 2023-11-08 PROCEDURE — 88342 IMHCHEM/IMCYTCHM 1ST ANTB: CPT | Performed by: PATHOLOGY

## 2023-11-08 PROCEDURE — 43239 EGD BIOPSY SINGLE/MULTIPLE: CPT | Performed by: INTERNAL MEDICINE

## 2023-11-08 PROCEDURE — 45380 COLONOSCOPY AND BIOPSY: CPT | Performed by: INTERNAL MEDICINE

## 2023-11-08 PROCEDURE — 88341 IMHCHEM/IMCYTCHM EA ADD ANTB: CPT | Performed by: PATHOLOGY

## 2023-11-08 PROCEDURE — 00813 ANES UPR LWR GI NDSC PX: CPT | Performed by: NURSE ANESTHETIST, CERTIFIED REGISTERED

## 2023-11-08 PROCEDURE — G8907 PT DOC NO EVENTS ON DISCHARG: HCPCS | Performed by: INTERNAL MEDICINE

## 2023-11-08 PROCEDURE — 88305 TISSUE EXAM BY PATHOLOGIST: CPT | Performed by: PATHOLOGY

## 2023-11-08 RX ORDER — ASCORBIC ACID 500 MG
TAKE 1 TABLET DAILY TABLET ORAL
Refills: 0 | Status: ACTIVE | COMMUNITY

## 2023-11-08 RX ORDER — PANTOPRAZOLE SODIUM 20 MG/1
1 TABLET TABLET, DELAYED RELEASE ORAL ONCE A DAY
Qty: 90 TABLET | Refills: 3 | Status: ACTIVE | COMMUNITY
Start: 2021-10-21

## 2023-11-08 RX ORDER — AMLODIPINE AND BENAZEPRIL HYDROCHLORIDE 5; 20 MG/1; MG/1
TAKE 1 CAPSULE BY ORAL ROUTE ONCE DAILY CAPSULE ORAL 1
Qty: 0 | Refills: 0 | Status: ACTIVE | COMMUNITY
Start: 1900-01-01

## 2023-11-08 RX ORDER — PRAVASTATIN SODIUM 20 MG/1
TAKE 1 TABLET DAILY TABLET ORAL
Refills: 0 | Status: ACTIVE | COMMUNITY

## 2023-11-08 RX ORDER — MESALAMINE 375 MG/1
4 CAPSULES IN THE MORNING CAPSULE, EXTENDED RELEASE ORAL ONCE A DAY
Qty: 120 CAPSULE | Refills: 11 | Status: ACTIVE | COMMUNITY

## 2023-11-14 ENCOUNTER — TELEPHONE ENCOUNTER (OUTPATIENT)
Dept: URBAN - METROPOLITAN AREA CLINIC 19 | Facility: CLINIC | Age: 78
End: 2023-11-14

## 2023-12-13 ENCOUNTER — OFFICE VISIT (OUTPATIENT)
Dept: URBAN - METROPOLITAN AREA CLINIC 19 | Facility: CLINIC | Age: 78
End: 2023-12-13

## 2023-12-21 ENCOUNTER — CLAIMS CREATED FROM THE CLAIM WINDOW (OUTPATIENT)
Dept: URBAN - METROPOLITAN AREA CLINIC 19 | Facility: CLINIC | Age: 78
End: 2023-12-21
Payer: MEDICARE

## 2023-12-21 VITALS
SYSTOLIC BLOOD PRESSURE: 124 MMHG | DIASTOLIC BLOOD PRESSURE: 78 MMHG | BODY MASS INDEX: 24.06 KG/M2 | OXYGEN SATURATION: 96 % | HEIGHT: 63 IN | TEMPERATURE: 97 F | HEART RATE: 75 BPM | WEIGHT: 135.8 LBS

## 2023-12-21 DIAGNOSIS — K50.90 CROHNS DISEASE: ICD-10-CM

## 2023-12-21 DIAGNOSIS — Z87.19 HISTORY OF BARRETT'S ESOPHAGUS: ICD-10-CM

## 2023-12-21 PROCEDURE — 99214 OFFICE O/P EST MOD 30 MIN: CPT | Performed by: NURSE PRACTITIONER

## 2023-12-21 RX ORDER — PANTOPRAZOLE SODIUM 20 MG/1
1 TABLET TABLET, DELAYED RELEASE ORAL ONCE A DAY
Qty: 90 TABLET | Refills: 3 | Status: ACTIVE | COMMUNITY
Start: 2021-10-21

## 2023-12-21 RX ORDER — AMLODIPINE AND BENAZEPRIL HYDROCHLORIDE 5; 20 MG/1; MG/1
TAKE 1 CAPSULE BY ORAL ROUTE ONCE DAILY CAPSULE ORAL 1
Qty: 0 | Refills: 0 | Status: ACTIVE | COMMUNITY
Start: 1900-01-01

## 2023-12-21 RX ORDER — ASCORBIC ACID 500 MG
TAKE 1 TABLET DAILY TABLET ORAL
Refills: 0 | Status: ACTIVE | COMMUNITY

## 2023-12-21 RX ORDER — MESALAMINE 375 MG/1
4 CAPSULES IN THE MORNING CAPSULE, EXTENDED RELEASE ORAL ONCE A DAY
Qty: 120 CAPSULE | Refills: 11 | Status: ACTIVE | COMMUNITY

## 2023-12-21 RX ORDER — PRAVASTATIN SODIUM 20 MG/1
TAKE 1 TABLET DAILY TABLET ORAL
Refills: 0 | Status: ACTIVE | COMMUNITY

## 2023-12-21 NOTE — HPI-TODAY'S VISIT:
Mrs. Peña is a 78-year-old female with Crohn's disease, Urias's esophagus who was last seen in GI clinic on  by Dr. Chaves 9/14/2023. Here for procedure follow-up.  Referral was sent to [facility] for abnormal urine (c/o bubbles and specks in urine)  EGD/colonoscopy was done on 11/8/2023 EGD small hiatal hernia, erythematous mucosa in the antrum, normal duodenum Colonoscopy a few ulcers in the neoterminal ileum, entire examined colon was normal Use Pentasa 1 g p.o. 4 times daily use budesonide 9 mg p.o. every morning x 8 weeks Path:Stomach antrum biopsy-chronic gastritis nonspecific negative for H. pylori.  Terminal ileum biopsy-chronic active ileitis with ulcer and pyloric gland metaplasia consistent with Crohn's disease. Random colon biopsies negative. Most recent labs 11/13/2023 WBC 8.9, H&H 11.5/37.0,  CMP unremarkable, TSH normal.                -------------------------------------        Prior history is summarized below:        - Crohn's disease dxd at time of surgery with CD around 2015 requiring ileo-colonic resection in [place]        -She had an EGD and colonoscopy on 11/30/2017 with Dr. Herrera. The EGD showed a small hiatal. The colonoscopy showed non-bleeding erosions in the terminal ileum and small internal hemorrhoids. The GE junction biopsies showed reflux associated changes. Terminal ileum showed "mild chronic active enteritis, consistent with history of Crohn's disease. Random colon biopsies were unremarkable.        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        -She on no medications for Crohn's disease as she is concerned about side effects of Crohn's disease.        -On 9/20/2021 she had an EGD and colonoscopy. EGD showed small hiatal hernia, mild gastritis, and multiple erosions in the duodenum. The colonoscopy showed multiple ulcers in soham-terminal ileum. Pathology of the lower esophagus was unremarkable. Gastric biopsies were negative for H. pylori. Duodenal biopsies were negative for Celiac disease. Terminal ileal biopsies showed focal acute inflammations. Random colon biopsies were negative for microscopic colitis.        -She had a CT chest performed by her Pulmonary physician which showed compared to 6/4/2021 there was a decreased size and conspicuity of a groundglass nodule in the lateral right lower lobe. This currently measures up to 3 mm, previously 8 mm in keeping with benign infectious or inflammatory etiology. Additional subcentimeter bilateral pulmonary nodules measuring up to 5 mm are stable." The report mentions cholelithiasis without surrounding inflammation to suggest acute cholecystitis. Calcified granulomas in the liver and spleen.        -On 10/11/2021 RUQ US showed cholelithiasis without significant gallbladder wall thickening.        -On 7/24/2023 CTE showed "no acute abnormalities within the abdomen or pelvis."

## 2023-12-22 ENCOUNTER — TELEPHONE ENCOUNTER (OUTPATIENT)
Dept: URBAN - METROPOLITAN AREA CLINIC 19 | Facility: CLINIC | Age: 78
End: 2023-12-22

## 2023-12-22 LAB
A/G RATIO: 1.5
ALBUMIN: 3.8
ALKALINE PHOSPHATASE: 73
ALT (SGPT): 12
AST (SGOT): 18
BILIRUBIN, TOTAL: 0.3
BUN/CREATININE RATIO: (no result)
BUN: 22
C-REACTIVE PROTEIN, QUANT: 2.2
CALCIUM: 8.6
CARBON DIOXIDE, TOTAL: 19
CHLORIDE: 109
CREATININE: 0.95
EGFR: 61
FERRITIN, SERUM: 27
FOLATE (FOLIC ACID), SERUM: 12.5
GLOBULIN, TOTAL: 2.6
GLUCOSE: 98
HEMATOCRIT: 38.4
HEMOGLOBIN: 12.3
IRON BIND.CAP.(TIBC): 412
IRON SATURATION: 8
IRON: 31
LIPASE: 401
MCH: 28
MCHC: 32
MCV: 87.3
MPV: 10.3
PLATELET COUNT: 276
POTASSIUM: 4.5
PROTEIN, TOTAL: 6.4
RDW: 13.4
RED BLOOD CELL COUNT: 4.4
SODIUM: 139
VITAMIN B12: 632
VITAMIN D,25-OH,TOTAL,IA: 18
WHITE BLOOD CELL COUNT: 12.9

## 2023-12-22 RX ORDER — MESALAMINE 500 MG/1
2 CAPSULES CAPSULE ORAL
Qty: 720 CAPSULE | Refills: 3 | OUTPATIENT
Start: 2023-12-29 | End: 2024-12-23

## 2023-12-26 ENCOUNTER — TELEPHONE ENCOUNTER (OUTPATIENT)
Dept: URBAN - METROPOLITAN AREA CLINIC 19 | Facility: CLINIC | Age: 78
End: 2023-12-26

## 2023-12-29 ENCOUNTER — TELEPHONE ENCOUNTER (OUTPATIENT)
Dept: URBAN - METROPOLITAN AREA CLINIC 19 | Facility: CLINIC | Age: 78
End: 2023-12-29

## 2023-12-29 RX ORDER — IRON FUM,PS CMP/VIT C/NIACIN 125-40-3MG
1 CAPSULE BETWEEN MEALS CAPSULE ORAL ONCE A DAY
Qty: 90 CAPSULE | Refills: 1 | OUTPATIENT
Start: 2023-12-29

## 2024-01-02 ENCOUNTER — TELEPHONE ENCOUNTER (OUTPATIENT)
Dept: URBAN - METROPOLITAN AREA CLINIC 19 | Facility: CLINIC | Age: 79
End: 2024-01-02

## 2024-01-03 LAB — LIPASE: 22

## 2024-01-13 LAB — CALPROTECTIN, FECAL: 190

## 2024-02-23 ENCOUNTER — OV EP (OUTPATIENT)
Dept: URBAN - METROPOLITAN AREA CLINIC 19 | Facility: CLINIC | Age: 79
End: 2024-02-23
Payer: MEDICARE

## 2024-02-23 VITALS
DIASTOLIC BLOOD PRESSURE: 64 MMHG | BODY MASS INDEX: 23.74 KG/M2 | HEIGHT: 63 IN | HEART RATE: 83 BPM | WEIGHT: 134 LBS | SYSTOLIC BLOOD PRESSURE: 129 MMHG | TEMPERATURE: 97.2 F

## 2024-02-23 DIAGNOSIS — K50.019 CROHN'S DISEASE OF SMALL INTESTINE WITH COMPLICATION: ICD-10-CM

## 2024-02-23 PROCEDURE — 99214 OFFICE O/P EST MOD 30 MIN: CPT | Performed by: NURSE PRACTITIONER

## 2024-02-23 RX ORDER — ASCORBIC ACID 500 MG
TAKE 1 TABLET DAILY TABLET ORAL
Refills: 0 | Status: ACTIVE | COMMUNITY

## 2024-02-23 RX ORDER — PRAVASTATIN SODIUM 20 MG/1
TAKE 1 TABLET DAILY TABLET ORAL
Refills: 0 | Status: ACTIVE | COMMUNITY

## 2024-02-23 RX ORDER — MESALAMINE 375 MG/1
4 CAPSULES IN THE MORNING CAPSULE, EXTENDED RELEASE ORAL ONCE A DAY
Qty: 120 CAPSULE | Refills: 11 | Status: ACTIVE | COMMUNITY

## 2024-02-23 RX ORDER — IRON FUM,PS CMP/VIT C/NIACIN 125-40-3MG
1 CAPSULE BETWEEN MEALS CAPSULE ORAL ONCE A DAY
Qty: 90 CAPSULE | Refills: 1 | Status: ACTIVE | COMMUNITY
Start: 2023-12-29

## 2024-02-23 RX ORDER — MESALAMINE 500 MG/1
2 CAPSULES CAPSULE ORAL
Qty: 720 CAPSULE | Refills: 3 | Status: ACTIVE | COMMUNITY
Start: 2023-12-29 | End: 2024-12-23

## 2024-02-23 RX ORDER — PANTOPRAZOLE SODIUM 20 MG/1
1 TABLET TABLET, DELAYED RELEASE ORAL ONCE A DAY
Qty: 90 TABLET | Refills: 3 | Status: ACTIVE | COMMUNITY
Start: 2021-10-21

## 2024-02-23 RX ORDER — AMLODIPINE AND BENAZEPRIL HYDROCHLORIDE 5; 20 MG/1; MG/1
TAKE 1 CAPSULE BY ORAL ROUTE ONCE DAILY CAPSULE ORAL 1
Qty: 0 | Refills: 0 | Status: ACTIVE | COMMUNITY
Start: 1900-01-01

## 2024-02-23 NOTE — HPI-TODAY'S VISIT:
Mrs. Peña is a 78-year-old female with Crohn's disease, Urias's esophagus presents for follow-up.   EGD/colonoscopy was done on 11/8/2023 EGD small hiatal hernia, erythematous mucosa in the antrum, normal duodenum Colonoscopy a few ulcers in the neoterminal ileum, entire examined colon was normal Path:Stomach antrum biopsy-chronic gastritis nonspecific negative for H. pylori. Terminal ileum biopsy-chronic active ileitis with ulcer and pyloric gland metaplasia consistent with Crohn's disease. Random colon biopsies negative.  She was started on Pentasa 1 g p.o. 4 times daily + budesonide 9 mg p.o. QAM x 8 weeks  Fecal calprotectin elevated 190 Iron studies mildly low with a ferritin of 27 CRP normal 2.2 B12 and folate normal Vitamin D low 18-instructed her to increase from 1000 to 2000 IU.  Sent in [facility]   Labs also showed a lipase of 400 and upon recheck was normal at 22.  She reported history of gallstones but denied having any pain or upper GI concerns. Reports she is planning to have her gallbladder removed with Dr. Salas. For Urias's esophagus instructed her on PPI therapy but she was hesitant stating one of her doctors wanted her to come off, she is on the lowest dose After review of her prior procedure records it did confirm Urias's on pathology from 2013 from another facility she had been on PPI since then   She reports budesonide seemed to make a difference. She reports she does not feel sure about Pentasa. Since coming off Budesonide, she feels more stomach upset. Continues on Pentasa but she feels this is what is causing her GI upset, reports she had "some diarrhea." Reports she feels she is having more cold symptoms. All in the last week. She reports that she stopped her iron supplement stating her PCP and an ER doctor told her "she should not be taking it."          -------------------------------------        Prior history is summarized below:        - Crohn's disease dxd at time of surgery with CD around 2015 requiring ileo-colonic resection in [place]        -She had an EGD and colonoscopy on 11/30/2017 with Dr. Herrera. The EGD showed a small hiatal. The colonoscopy showed non-bleeding erosions in the terminal ileum and small internal hemorrhoids. The GE junction biopsies showed reflux associated changes. Terminal ileum showed "mild chronic active enteritis, consistent with history of Crohn's disease. Random colon biopsies were unremarkable.        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        -She on no medications for Crohn's disease as she is concerned about side effects of Crohn's disease.        -On 9/20/2021 she had an EGD and colonoscopy. EGD showed small hiatal hernia, mild gastritis, and multiple erosions in the duodenum. The colonoscopy showed multiple ulcers in soham-terminal ileum. Pathology of the lower esophagus was unremarkable. Gastric biopsies were negative for H. pylori. Duodenal biopsies were negative for Celiac disease. Terminal ileal biopsies showed focal acute inflammations. Random colon biopsies were negative for microscopic colitis.        -She had a CT chest performed by her Pulmonary physician which showed compared to 6/4/2021 there was a decreased size and conspicuity of a groundglass nodule in the lateral right lower lobe. This currently measures up to 3 mm, previously 8 mm in keeping with benign infectious or inflammatory etiology. Additional subcentimeter bilateral pulmonary nodules measuring up to 5 mm are stable." The report mentions cholelithiasis without surrounding inflammation to suggest acute cholecystitis. Calcified granulomas in the liver and spleen.        -On 10/11/2021 RUQ US showed cholelithiasis without significant gallbladder wall thickening.        -On 7/24/2023 CTE showed "no acute abnormalities within the abdomen or pelvis." -Labs 11/13/2023 WBC 8.9, H&H 11.5/37.0,  CMP unremarkable, TSH normal.

## 2024-02-26 LAB
A/G RATIO: 1.6
ALBUMIN: 3.8
ALKALINE PHOSPHATASE: 57
ALT (SGPT): 19
AST (SGOT): 23
BILIRUBIN, TOTAL: 0.4
BUN/CREATININE RATIO: (no result)
BUN: 19
C-REACTIVE PROTEIN, QUANT: 3.2
CALCIUM: 8.9
CARBON DIOXIDE, TOTAL: 18
CHLORIDE: 109
CREATININE: 0.92
EGFR: 64
FERRITIN, SERUM: 26
GLOBULIN, TOTAL: 2.4
GLUCOSE: 84
HEMATOCRIT: 35.3
HEMOGLOBIN: 11.5
IRON BIND.CAP.(TIBC): 487
IRON SATURATION: 11
IRON: 54
MCH: 28.3
MCHC: 32.6
MCV: 86.9
MPV: 10.1
PLATELET COUNT: 327
POTASSIUM: 4.2
PROTEIN, TOTAL: 6.2
RDW: 13.1
RED BLOOD CELL COUNT: 4.06
SODIUM: 138
WHITE BLOOD CELL COUNT: 10.5

## 2024-03-07 ENCOUNTER — OV EP (OUTPATIENT)
Dept: URBAN - METROPOLITAN AREA CLINIC 19 | Facility: CLINIC | Age: 79
End: 2024-03-07
Payer: MEDICARE

## 2024-03-07 VITALS
HEIGHT: 63 IN | BODY MASS INDEX: 24.1 KG/M2 | TEMPERATURE: 97.2 F | DIASTOLIC BLOOD PRESSURE: 70 MMHG | SYSTOLIC BLOOD PRESSURE: 120 MMHG | WEIGHT: 136 LBS

## 2024-03-07 DIAGNOSIS — K50.019 CROHN'S DISEASE OF SMALL INTESTINE WITH COMPLICATION: ICD-10-CM

## 2024-03-07 DIAGNOSIS — R10.11 RUQ ABDOMINAL PAIN: ICD-10-CM

## 2024-03-07 PROCEDURE — 99215 OFFICE O/P EST HI 40 MIN: CPT | Performed by: INTERNAL MEDICINE

## 2024-03-07 RX ORDER — IRON FUM,PS CMP/VIT C/NIACIN 125-40-3MG
1 CAPSULE BETWEEN MEALS CAPSULE ORAL ONCE A DAY
Qty: 90 CAPSULE | Refills: 1 | COMMUNITY
Start: 2023-12-29

## 2024-03-07 RX ORDER — MESALAMINE 500 MG/1
2 CAPSULES CAPSULE ORAL
Qty: 720 CAPSULE | Refills: 3 | COMMUNITY
Start: 2023-12-29 | End: 2024-12-23

## 2024-03-07 RX ORDER — AMLODIPINE AND BENAZEPRIL HYDROCHLORIDE 5; 20 MG/1; MG/1
TAKE 1 CAPSULE BY ORAL ROUTE ONCE DAILY CAPSULE ORAL 1
Qty: 0 | Refills: 0 | COMMUNITY
Start: 1900-01-01

## 2024-03-07 RX ORDER — ASCORBIC ACID 500 MG
TAKE 1 TABLET DAILY TABLET ORAL
Refills: 0 | COMMUNITY

## 2024-03-07 RX ORDER — PANTOPRAZOLE SODIUM 20 MG/1
1 TABLET TABLET, DELAYED RELEASE ORAL ONCE A DAY
Qty: 90 TABLET | Refills: 3 | COMMUNITY
Start: 2021-10-21

## 2024-03-07 RX ORDER — MESALAMINE 375 MG/1
4 CAPSULES IN THE MORNING CAPSULE, EXTENDED RELEASE ORAL ONCE A DAY
Qty: 120 CAPSULE | Refills: 11 | COMMUNITY

## 2024-03-07 RX ORDER — PRAVASTATIN SODIUM 20 MG/1
TAKE 1 TABLET DAILY TABLET ORAL
Refills: 0 | COMMUNITY

## 2024-03-07 NOTE — HPI-TODAY'S VISIT:
Mrs. Peña is a 78-year-old female with Crohn's disease, Urias's esophagus presents for follow-up.   She reports having RUQ abdominal pain.   On 1/2/2024 she had a RUQ US that showed "the gallbladder is large/well distended and contains gallstones with positive sonographic Russell sign which are suspicious for acute cholecystitis."   On 2/23/2024 labs showed iron saturation 11%, CRP 3.2, Cr 0.92, CO2 18, AST 23, ALT 19, Alk phos 57, T bili 0.4, WBC 10.5, Hgb 11.5, Plt 327.          Prior history is summarized below:        - Crohn's disease dxd at time of surgery with CD around 2015 requiring ileo-colonic resection in [place]        -She had an EGD and colonoscopy on 11/30/2017 with Dr. Herrera. The EGD showed a small hiatal. The colonoscopy showed non-bleeding erosions in the terminal ileum and small internal hemorrhoids. The GE junction biopsies showed reflux associated changes. Terminal ileum showed "mild chronic active enteritis, consistent with history of Crohn's disease. Random colon biopsies were unremarkable.        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        -She on no medications for Crohn's disease as she is concerned about side effects of Crohn's disease.        -On 9/20/2021 she had an EGD and colonoscopy. EGD showed small hiatal hernia, mild gastritis, and multiple erosions in the duodenum. The colonoscopy showed multiple ulcers in soham-terminal ileum. Pathology of the lower esophagus was unremarkable. Gastric biopsies were negative for H. pylori. Duodenal biopsies were negative for Celiac disease. Terminal ileal biopsies showed focal acute inflammations. Random colon biopsies were negative for microscopic colitis.          -She had a CT chest performed by her Pulmonary physician which showed compared to 6/4/2021 there was a decreased size and conspicuity of a groundglass nodule in the lateral right lower lobe. This currently measures up to 3 mm, previously 8 mm in keeping with benign infectious or inflammatory etiology. Additional subcentimeter bilateral pulmonary nodules measuring up to 5 mm are stable." The report mentions cholelithiasis without surrounding inflammation to suggest acute cholecystitis. Calcified granulomas in the liver and spleen.        -On 10/11/2021 RUQ US showed cholelithiasis without significant gallbladder wall thickening.        -On 7/24/2023 CTE showed "no acute abnormalities within the abdomen or pelvis." -Labs 11/13/2023 WBC 8.9, H&H 11.5/37.0,  CMP unremarkable, TSH normal. -EGD/colonoscopy was done on 11/8/2023. EGD small hiatal hernia, erythematous mucosa in the antrum, normal duodenum. Colonoscopy a few ulcers in the neoterminal ileum, entire examined colon was normal. Path:Stomach antrum biopsy-chronic gastritis nonspecific negative for H. pylori. Terminal ileum biopsy-chronic active ileitis with ulcer and pyloric gland metaplasia consistent with Crohn's disease. Random colon biopsies negative.

## 2024-03-08 LAB
A/G RATIO: 1.7
ALBUMIN: 3.8
ALKALINE PHOSPHATASE: 67
ALT (SGPT): 18
AST (SGOT): 26
BILIRUBIN, TOTAL: 0.3
BUN/CREATININE RATIO: 18
BUN: 19
C-REACTIVE PROTEIN, QUANT: 1.9
CALCIUM: 8.7
CARBON DIOXIDE, TOTAL: 19
CHLORIDE: 109
CREATININE: 1.05
EGFR: 54
GLOBULIN, TOTAL: 2.3
GLUCOSE: 77
HEMATOCRIT: 34.7
HEMOGLOBIN: 11.1
MCH: 28
MCHC: 32
MCV: 87.4
MPV: 10.1
PLATELET COUNT: 273
POTASSIUM: 4.7
PROTEIN, TOTAL: 6.1
RDW: 13.2
RED BLOOD CELL COUNT: 3.97
SODIUM: 140
WHITE BLOOD CELL COUNT: 7.6

## 2024-04-15 ENCOUNTER — OV EP (OUTPATIENT)
Dept: URBAN - METROPOLITAN AREA CLINIC 19 | Facility: CLINIC | Age: 79
End: 2024-04-15

## 2025-04-03 ENCOUNTER — APPOINTMENT (OUTPATIENT)
Dept: URBAN - METROPOLITAN AREA CLINIC 163 | Facility: CLINIC | Age: 80
Setting detail: DERMATOLOGY
End: 2025-04-03

## 2025-04-03 DIAGNOSIS — L43.8 OTHER LICHEN PLANUS: ICD-10-CM | Status: INADEQUATELY CONTROLLED

## 2025-04-03 PROCEDURE — ? DIAGNOSIS COMMENT

## 2025-04-03 PROCEDURE — ? PRESCRIPTION

## 2025-04-03 PROCEDURE — 99204 OFFICE O/P NEW MOD 45 MIN: CPT

## 2025-04-03 PROCEDURE — ? COUNSELING

## 2025-04-03 PROCEDURE — ? PRESCRIPTION MEDICATION MANAGEMENT

## 2025-04-03 RX ORDER — CLOBETASOL PROPIONATE 0.5 MG/G
CREAM TOPICAL BID
Qty: 60 | Refills: 1 | Status: ERX | COMMUNITY
Start: 2025-04-03

## 2025-04-03 RX ORDER — PIMECROLIMUS 10 MG/G
CREAM TOPICAL
Qty: 100 | Refills: 1 | Status: ERX | COMMUNITY
Start: 2025-04-03

## 2025-04-03 RX ORDER — CLOBETASOL PROPIONATE 0.05 G/100ML
SHAMPOO TOPICAL
Qty: 118 | Refills: 2 | Status: ERX | COMMUNITY
Start: 2025-04-03

## 2025-04-03 RX ORDER — KETOCONAZOLE 20 MG/ML
SHAMPOO, SUSPENSION TOPICAL
Qty: 120 | Refills: 2 | Status: ERX | COMMUNITY
Start: 2025-04-03

## 2025-04-03 RX ADMIN — CLOBETASOL PROPIONATE: 0.5 CREAM TOPICAL at 00:00

## 2025-04-03 RX ADMIN — CLOBETASOL PROPIONATE: 0.05 SHAMPOO TOPICAL at 00:00

## 2025-04-03 RX ADMIN — PIMECROLIMUS: 10 CREAM TOPICAL at 00:00

## 2025-04-03 RX ADMIN — KETOCONAZOLE: 20 SHAMPOO, SUSPENSION TOPICAL at 00:00

## 2025-04-03 ASSESSMENT — SEVERITY ASSESSMENT: SEVERITY: MODERATE

## 2025-04-03 ASSESSMENT — LOCATION SIMPLE DESCRIPTION DERM
LOCATION SIMPLE: RIGHT SUPERIOR GINGIVAE
LOCATION SIMPLE: LEFT SUPERIOR GINGIVAE
LOCATION SIMPLE: LEFT UPPER ARM
LOCATION SIMPLE: HARD PALATE
LOCATION SIMPLE: RIGHT UPPER ARM
LOCATION SIMPLE: LEFT FOREARM
LOCATION SIMPLE: FRONTAL SCALP
LOCATION SIMPLE: RIGHT ANTERIOR NECK
LOCATION SIMPLE: RIGHT FOREARM
LOCATION SIMPLE: LEFT SHOULDER

## 2025-04-03 ASSESSMENT — LOCATION DETAILED DESCRIPTION DERM
LOCATION DETAILED: LEFT SUPERIOR GINGIVAE
LOCATION DETAILED: RIGHT VENTRAL DISTAL FOREARM
LOCATION DETAILED: LEFT ANTERIOR PROXIMAL UPPER ARM
LOCATION DETAILED: LEFT HARD PALATE
LOCATION DETAILED: RIGHT ANTERIOR PROXIMAL UPPER ARM
LOCATION DETAILED: RIGHT CLAVICULAR NECK
LOCATION DETAILED: LEFT VENTRAL DISTAL FOREARM
LOCATION DETAILED: RIGHT HARD PALATE
LOCATION DETAILED: RIGHT SUPERIOR GINGIVAE
LOCATION DETAILED: LEFT ANTERIOR SHOULDER
LOCATION DETAILED: MEDIAL FRONTAL SCALP

## 2025-04-03 ASSESSMENT — LOCATION ZONE DERM
LOCATION ZONE: SCALP
LOCATION ZONE: ARM
LOCATION ZONE: NECK
LOCATION ZONE: MUCOUS_MEMBRANE

## 2025-04-03 ASSESSMENT — PAIN INTENSITY VAS: HOW INTENSE IS YOUR PAIN 0 BEING NO PAIN, 10 BEING THE MOST SEVERE PAIN POSSIBLE?: 1/10 PAIN

## 2025-04-03 ASSESSMENT — BSA RASH: BSA RASH: 22

## 2025-04-03 NOTE — PROCEDURE: PRESCRIPTION MEDICATION MANAGEMENT
Initiate Treatment: - recommended alternating shampoos: salicylic acid shampoo, ketoconazole shampoo, and clobetasol 0.05% shampoo. Lather 5 mins to scalp then rinse\\n-start medrock lichen planus mouthwash twice daily until clear then taper\\n-pimecrolimus cream twice daily to red scaly areas twice daily monday through friday\\n-clobetasol cream 0.05% cream twice daily saturday and sunday
Continue Regimen: Clobetasol 0.05% cream
Render In Strict Bullet Format?: No
Plan: If not improved, discussed oral methotrexate and Acitretin. Labs will be needed prior to starting.
Detail Level: Zone

## 2025-04-03 NOTE — HPI: RASH
Is This A New Presentation, Or A Follow-Up?: Rash
Additional History: Bx proven fixed drug per patient;\\nNow present in the mouth

## 2025-04-03 NOTE — PROCEDURE: DIAGNOSIS COMMENT
Comment: -hx of Crohns disease\\n-mouth involvement\\n-advised against oral steroids\\n-recommended topical steroids sat/sun and non-steroidal monday through friday
Detail Level: Simple
Render Risk Assessment In Note?: no

## 2025-04-22 ENCOUNTER — APPOINTMENT (OUTPATIENT)
Dept: URBAN - METROPOLITAN AREA CLINIC 163 | Facility: CLINIC | Age: 80
Setting detail: DERMATOLOGY
End: 2025-04-22

## 2025-04-22 DIAGNOSIS — L56.5 DISSEMINATED SUPERFICIAL ACTINIC POROKERATOSIS (DSAP): ICD-10-CM | Status: INADEQUATELY CONTROLLED

## 2025-04-22 DIAGNOSIS — L82.0 INFLAMED SEBORRHEIC KERATOSIS: ICD-10-CM | Status: INADEQUATELY CONTROLLED

## 2025-04-22 DIAGNOSIS — L57.8 OTHER SKIN CHANGES DUE TO CHRONIC EXPOSURE TO NONIONIZING RADIATION: ICD-10-CM | Status: INADEQUATELY CONTROLLED

## 2025-04-22 DIAGNOSIS — L43.8 OTHER LICHEN PLANUS: ICD-10-CM | Status: RESOLVING

## 2025-04-22 PROCEDURE — ? COUNSELING

## 2025-04-22 PROCEDURE — ? DIAGNOSIS COMMENT

## 2025-04-22 PROCEDURE — 99214 OFFICE O/P EST MOD 30 MIN: CPT

## 2025-04-22 PROCEDURE — ? PRESCRIPTION MEDICATION MANAGEMENT

## 2025-04-22 ASSESSMENT — LOCATION SIMPLE DESCRIPTION DERM
LOCATION SIMPLE: FRONTAL SCALP
LOCATION SIMPLE: LEFT UPPER ARM
LOCATION SIMPLE: RIGHT ANTERIOR NECK
LOCATION SIMPLE: RIGHT FOREARM
LOCATION SIMPLE: NOSE
LOCATION SIMPLE: LEFT FOREARM
LOCATION SIMPLE: LEFT SHOULDER
LOCATION SIMPLE: RIGHT PRETIBIAL REGION
LOCATION SIMPLE: RIGHT UPPER ARM
LOCATION SIMPLE: RIGHT SUPERIOR GINGIVAE
LOCATION SIMPLE: HARD PALATE
LOCATION SIMPLE: LEFT SUPERIOR GINGIVAE

## 2025-04-22 ASSESSMENT — LOCATION ZONE DERM
LOCATION ZONE: ARM
LOCATION ZONE: LEG
LOCATION ZONE: NOSE
LOCATION ZONE: SCALP
LOCATION ZONE: MUCOUS_MEMBRANE
LOCATION ZONE: NECK

## 2025-04-22 ASSESSMENT — LOCATION DETAILED DESCRIPTION DERM
LOCATION DETAILED: LEFT SUPERIOR GINGIVAE
LOCATION DETAILED: MEDIAL FRONTAL SCALP
LOCATION DETAILED: RIGHT VENTRAL DISTAL FOREARM
LOCATION DETAILED: LEFT ANTERIOR SHOULDER
LOCATION DETAILED: RIGHT CLAVICULAR NECK
LOCATION DETAILED: RIGHT ANTERIOR PROXIMAL UPPER ARM
LOCATION DETAILED: RIGHT HARD PALATE
LOCATION DETAILED: LEFT HARD PALATE
LOCATION DETAILED: RIGHT SUPERIOR GINGIVAE
LOCATION DETAILED: RIGHT PROXIMAL PRETIBIAL REGION
LOCATION DETAILED: LEFT VENTRAL DISTAL FOREARM
LOCATION DETAILED: LEFT ANTERIOR PROXIMAL UPPER ARM
LOCATION DETAILED: NASAL DORSUM

## 2025-04-22 NOTE — PROCEDURE: DIAGNOSIS COMMENT
Comment: -hx of Crohns disease\\n-mouth involvement\\n-advised against oral steroids\\n-recommended topical steroids sat/sun and non-steroidal monday through friday\\n========4/22/25\\n-improvement noted on scalp, mouth and arms today. Patient doing well on current medications.\\n-okay to use pimecrolimus on the right eyelid twice daily then taper as improved
Detail Level: Simple
Render Risk Assessment In Note?: no
Comment: Previously tx with LN2 at [facility]\\nCurrently smooth, discussed may return with time, no additional treatment needed at this time

## 2025-04-22 NOTE — PROCEDURE: PRESCRIPTION MEDICATION MANAGEMENT
Continue Regimen: -alternating shampoos; salicylic acid shampoo, clobetasol 0.05% shampoo, ketoconazole 2% shampoo; lather 5 minutes then rinse\\n-medrock mouthwash- swish and spit; only use when condition is flared\\n-clobetasol 0.05% cream bid x sat and sun only\\n-pimecrolimus cream twice daily monday through friday
Render In Strict Bullet Format?: No
Plan: If not improved, discussed oral methotrexate and Acitretin. Labs will be needed prior to starting.
Detail Level: Zone

## 2025-04-22 NOTE — PROCEDURE: COUNSELING
Detail Level: Simple
Detail Level: Detailed
Patient Specific Counseling (Will Not Stick From Patient To Patient): Offered destruction, deferred for today

## 2025-07-22 ENCOUNTER — APPOINTMENT (OUTPATIENT)
Dept: URBAN - METROPOLITAN AREA CLINIC 163 | Facility: CLINIC | Age: 80
Setting detail: DERMATOLOGY
End: 2025-07-22

## 2025-07-22 DIAGNOSIS — L43.8 OTHER LICHEN PLANUS: ICD-10-CM

## 2025-07-22 PROCEDURE — ? DIAGNOSIS COMMENT

## 2025-07-22 PROCEDURE — ? PRESCRIPTION MEDICATION MANAGEMENT

## 2025-07-22 PROCEDURE — ?

## 2025-07-22 PROCEDURE — ? COUNSELING

## 2025-07-22 PROCEDURE — ? PRESCRIPTION

## 2025-07-22 RX ORDER — TACROLIMUS 1 MG/G
OINTMENT TOPICAL
Qty: 30 | Refills: 2 | Status: ERX | COMMUNITY
Start: 2025-07-22

## 2025-07-22 RX ORDER — PHARMACY COMPOUNDING ACCESSORY
EACH MISCELLANEOUS
Qty: 118 | Refills: 2 | Status: ERX | COMMUNITY
Start: 2025-07-22

## 2025-07-22 RX ORDER — PIMECROLIMUS 10 MG/G
CREAM TOPICAL
Qty: 30 | Refills: 6 | Status: ERX

## 2025-07-22 RX ADMIN — TACROLIMUS: 1 OINTMENT TOPICAL at 00:00

## 2025-07-22 RX ADMIN — Medication: at 00:00

## 2025-07-22 ASSESSMENT — LOCATION DETAILED DESCRIPTION DERM
LOCATION DETAILED: LEFT HARD PALATE
LOCATION DETAILED: RIGHT CLAVICULAR NECK
LOCATION DETAILED: MEDIAL FRONTAL SCALP
LOCATION DETAILED: RIGHT SUPERIOR GINGIVAE
LOCATION DETAILED: RIGHT VENTRAL DISTAL FOREARM
LOCATION DETAILED: LEFT SUPERIOR GINGIVAE
LOCATION DETAILED: LEFT VENTRAL DISTAL FOREARM

## 2025-07-22 ASSESSMENT — LOCATION SIMPLE DESCRIPTION DERM
LOCATION SIMPLE: LEFT FOREARM
LOCATION SIMPLE: LEFT SUPERIOR GINGIVAE
LOCATION SIMPLE: RIGHT ANTERIOR NECK
LOCATION SIMPLE: HARD PALATE
LOCATION SIMPLE: FRONTAL SCALP
LOCATION SIMPLE: RIGHT FOREARM
LOCATION SIMPLE: RIGHT SUPERIOR GINGIVAE

## 2025-07-22 ASSESSMENT — LOCATION ZONE DERM
LOCATION ZONE: MUCOUS_MEMBRANE
LOCATION ZONE: SCALP
LOCATION ZONE: NECK
LOCATION ZONE: ARM

## 2025-07-22 NOTE — PROCEDURE: PRESCRIPTION MEDICATION MANAGEMENT
Initiate Treatment: Tacrolimus ointment tucked into problem areas of the gums with gauze.
Continue Regimen: -alternating shampoos; salicylic acid shampoo, clobetasol 0.05% shampoo apply dry to scalp and rinse after 15 minutes, ketoconazole 2% shampoo; lather 5 minutes then rinse\\n-clobetasol 0.05% cream bid to red itchy spots x sat and sun only\\n-pimecrolimus cream apply to red itchy spots twice daily monday through friday
Render In Strict Bullet Format?: No
Plan: 3months
Detail Level: Zone